# Patient Record
Sex: MALE | Race: WHITE | NOT HISPANIC OR LATINO | Employment: FULL TIME | URBAN - METROPOLITAN AREA
[De-identification: names, ages, dates, MRNs, and addresses within clinical notes are randomized per-mention and may not be internally consistent; named-entity substitution may affect disease eponyms.]

---

## 2017-01-05 ENCOUNTER — TELEPHONE (OUTPATIENT)
Dept: FAMILY MEDICINE CLINIC | Facility: CLINIC | Age: 52
End: 2017-01-05

## 2017-01-05 NOTE — TELEPHONE ENCOUNTER
Patient was seen on 12/21 and was prescribed a Z-Pac.  Patient states he has finished his antibiotic but now needs something for cough. Patient has nka. [facility] is  [facility].

## 2017-02-16 RX ORDER — DICYCLOMINE HCL 20 MG
20 TABLET ORAL EVERY 6 HOURS
Qty: 20 TABLET | Refills: 0 | Status: SHIPPED | OUTPATIENT
Start: 2017-02-16 | End: 2018-10-11

## 2017-10-27 ENCOUNTER — CLINICAL SUPPORT (OUTPATIENT)
Dept: FAMILY MEDICINE CLINIC | Facility: CLINIC | Age: 52
End: 2017-10-27

## 2017-10-27 DIAGNOSIS — Z23 IMMUNIZATION DUE: Primary | ICD-10-CM

## 2018-10-11 ENCOUNTER — OFFICE VISIT (OUTPATIENT)
Dept: FAMILY MEDICINE CLINIC | Facility: CLINIC | Age: 53
End: 2018-10-11

## 2018-10-11 VITALS
OXYGEN SATURATION: 98 % | DIASTOLIC BLOOD PRESSURE: 58 MMHG | HEART RATE: 55 BPM | WEIGHT: 204 LBS | HEIGHT: 69 IN | SYSTOLIC BLOOD PRESSURE: 126 MMHG | BODY MASS INDEX: 30.21 KG/M2 | TEMPERATURE: 98.3 F

## 2018-10-11 DIAGNOSIS — F32.9 REACTIVE DEPRESSION: Primary | ICD-10-CM

## 2018-10-11 DIAGNOSIS — F41.9 ANXIETY: ICD-10-CM

## 2018-10-11 DIAGNOSIS — I10 ESSENTIAL HYPERTENSION, BENIGN: ICD-10-CM

## 2018-10-11 DIAGNOSIS — Z23 IMMUNIZATION DUE: ICD-10-CM

## 2018-10-11 PROCEDURE — 99214 OFFICE O/P EST MOD 30 MIN: CPT | Performed by: FAMILY MEDICINE

## 2018-10-12 NOTE — PROGRESS NOTES
Subjective   Luis Faust is a 53 y.o. male    Chief Complaint:    Hypertension  Anxiety and depression      History of Present Illness     Patient presents today for follow-up regarding hypertension.  He recently developed some monocular blepharospasm and was seen by his optometrist who felt this was stress related.  Patient has been suffering with anxiety, depression and grief since the death of his 25-year-old son due to opioid overdose.  Extended visit today of > 25 minutes with greater than 50% time spent counseling with patient.  Patient is involved in a group grief counseling but his wife attends also.  I have recommended the patient get some individual counseling.  He is agreeable to this.      The following portions of the patient's history were reviewed and updated as appropriate: allergies, current medications, past social history and problem list    Review of Systems   Constitutional: Negative for appetite change, fatigue and unexpected weight change.   Respiratory: Negative for cough, chest tightness and shortness of breath.    Cardiovascular: Negative for chest pain, palpitations and leg swelling.   Gastrointestinal: Negative for abdominal pain, diarrhea and nausea.   Skin: Negative for color change and rash.   Neurological: Negative for dizziness, tremors, syncope, weakness, light-headedness and headaches.   Psychiatric/Behavioral: Positive for dysphoric mood and sleep disturbance. Negative for agitation, behavioral problems, confusion, decreased concentration and suicidal ideas. The patient is nervous/anxious.        Objective     Vitals:    10/11/18 1604   BP: 126/58   Pulse: 55   Temp: 98.3 °F (36.8 °C)   SpO2: 98%       Physical Exam   Constitutional: He appears well-developed and well-nourished.   Neck: No JVD present.   Cardiovascular: Normal rate, regular rhythm, normal heart sounds, intact distal pulses and normal pulses.    No murmur heard.  Pulmonary/Chest: Effort normal and breath  sounds normal. No respiratory distress.   Abdominal: Soft. Bowel sounds are normal. There is no hepatosplenomegaly. There is no tenderness.   Musculoskeletal: He exhibits no edema.   Skin: Skin is warm and dry.   Psychiatric: His speech is normal and behavior is normal. Cognition and memory are normal. He exhibits a depressed mood.   Nursing note and vitals reviewed.      Assessment/Plan   Problem List Items Addressed This Visit        Cardiovascular and Mediastinum    Essential hypertension, benign      Other Visit Diagnoses     Reactive depression    -  Primary    Immunization due        Relevant Medications    pneumococcal polysaccharide 23-valent (PNEUMOVAX-23) vaccine 0.5 mL (Completed)    Anxiety

## 2019-07-29 ENCOUNTER — TELEPHONE (OUTPATIENT)
Dept: FAMILY MEDICINE CLINIC | Facility: CLINIC | Age: 54
End: 2019-07-29

## 2019-07-29 NOTE — TELEPHONE ENCOUNTER
I printed this message and gave it to Nabila so she can contact patient to schedule his appointment.

## 2019-07-29 NOTE — TELEPHONE ENCOUNTER
----- Message from Sally Chowdhury sent at 7/29/2019  8:21 AM EDT -----  Contact: PATIENT  PATIENT NEEDS A PHYSICAL BEFORE SEPT 1ST FOR INSURANCE REASONS; HOWEVER, DR. VARGAS DOES NOT HAVE ANY APPOINTMENTS UNTIL OCT. PATIENT WANTS ME TO ASK DR. VARGAS IS HE COULD SEE HIM BEFORE SEPT 1ST.    CELL [phone]

## 2019-08-07 ENCOUNTER — LAB (OUTPATIENT)
Dept: LAB | Facility: HOSPITAL | Age: 54
End: 2019-08-07

## 2019-08-07 ENCOUNTER — OFFICE VISIT (OUTPATIENT)
Dept: FAMILY MEDICINE CLINIC | Facility: CLINIC | Age: 54
End: 2019-08-07

## 2019-08-07 VITALS
WEIGHT: 204 LBS | SYSTOLIC BLOOD PRESSURE: 120 MMHG | HEART RATE: 64 BPM | TEMPERATURE: 98 F | DIASTOLIC BLOOD PRESSURE: 64 MMHG | HEIGHT: 69 IN | OXYGEN SATURATION: 98 % | BODY MASS INDEX: 30.21 KG/M2

## 2019-08-07 DIAGNOSIS — Z12.5 PROSTATE CANCER SCREENING: ICD-10-CM

## 2019-08-07 DIAGNOSIS — Z00.00 ROUTINE GENERAL MEDICAL EXAMINATION AT A HEALTH CARE FACILITY: ICD-10-CM

## 2019-08-07 DIAGNOSIS — Z00.00 ROUTINE GENERAL MEDICAL EXAMINATION AT A HEALTH CARE FACILITY: Primary | ICD-10-CM

## 2019-08-07 LAB
ALBUMIN SERPL-MCNC: 4.4 G/DL (ref 3.5–5.2)
ALP SERPL-CCNC: 58 U/L (ref 39–117)
ALT SERPL W P-5'-P-CCNC: 18 U/L (ref 1–41)
AST SERPL-CCNC: 17 U/L (ref 1–40)
BILIRUB CONJ SERPL-MCNC: <0.2 MG/DL (ref 0.2–0.3)
BILIRUB INDIRECT SERPL-MCNC: ABNORMAL MG/DL
BILIRUB SERPL-MCNC: 0.4 MG/DL (ref 0.2–1.2)
DEPRECATED RDW RBC AUTO: 41.2 FL (ref 37–54)
ERYTHROCYTE [DISTWIDTH] IN BLOOD BY AUTOMATED COUNT: 12.5 % (ref 12.3–15.4)
HCT VFR BLD AUTO: 48.1 % (ref 37.5–51)
HGB BLD-MCNC: 15.6 G/DL (ref 13–17.7)
MCH RBC QN AUTO: 29.4 PG (ref 26.6–33)
MCHC RBC AUTO-ENTMCNC: 32.4 G/DL (ref 31.5–35.7)
MCV RBC AUTO: 90.6 FL (ref 79–97)
PLATELET # BLD AUTO: 268 10*3/MM3 (ref 140–450)
PMV BLD AUTO: 11.3 FL (ref 6–12)
PROT SERPL-MCNC: 7.2 G/DL (ref 6–8.5)
PSA SERPL-MCNC: 0.89 NG/ML (ref 0–4)
RBC # BLD AUTO: 5.31 10*6/MM3 (ref 4.14–5.8)
WBC NRBC COR # BLD: 6.9 10*3/MM3 (ref 3.4–10.8)

## 2019-08-07 PROCEDURE — 85027 COMPLETE CBC AUTOMATED: CPT

## 2019-08-07 PROCEDURE — 80076 HEPATIC FUNCTION PANEL: CPT

## 2019-08-07 PROCEDURE — 36415 COLL VENOUS BLD VENIPUNCTURE: CPT

## 2019-08-07 PROCEDURE — G0103 PSA SCREENING: HCPCS

## 2019-08-07 PROCEDURE — 99396 PREV VISIT EST AGE 40-64: CPT | Performed by: FAMILY MEDICINE

## 2019-08-07 NOTE — PROGRESS NOTES
Subjective   Luis Faust is a 53 y.o. male    Chief Complaint    Annual physical exam  Prostate cancer screening  Type 1 diabetes mellitus on insulin pump    History of Present Illness  Patient presents today for annual physical examination and prostate cancer screening.  He had a recent follow-up with his endocrinologist at the [facility].  Labs including a BMP, hemoglobin A1c and urine microalbumin test were all done.  Copies are provided and will be scanned into his electronic medical record here.  He did not have a PSA, CBC or liver function studies.  We will get those today.  Patient's only concern is that of some fatigue.  He exercises regularly and has not noticed any difference at all in his exercise stamina.  He does not have any chest pain whatsoever and does not have exertional dyspnea.  Patient has a son who is a 3-year medical student at [employer] and is doing quite well.  He lost a son last year to opioid overdose.  He seems to be absorbing that loss appropriately.  He did receive counseling.    The following portions of the patient's history were reviewed and updated as appropriate: allergies, current medications, past social history and problem list    Review of Systems   Constitutional: Negative.  Negative for appetite change, diaphoresis, fatigue and unexpected weight change.   HENT: Negative.    Eyes: Negative.  Negative for visual disturbance.   Respiratory: Negative.  Negative for chest tightness and shortness of breath.    Cardiovascular: Negative.  Negative for chest pain, palpitations and leg swelling.   Gastrointestinal: Negative.  Negative for diarrhea, nausea and vomiting.   Endocrine: Negative.  Negative for polydipsia, polyphagia and polyuria.   Genitourinary: Negative.    Musculoskeletal: Negative.    Skin: Negative.  Negative for color change.   Allergic/Immunologic: Negative.    Neurological: Negative.  Negative for dizziness, weakness,  light-headedness and numbness.   Hematological: Negative.    Psychiatric/Behavioral: Negative.    All other systems reviewed and are negative.      Objective     Vitals:    08/07/19 0852   BP: 120/64   Pulse: 64   Temp: 98 °F (36.7 °C)   SpO2: 98%       Physical Exam   Constitutional: He is oriented to person, place, and time. He appears well-developed and well-nourished.   HENT:   Head: Normocephalic and atraumatic.   Right Ear: External ear normal.   Left Ear: External ear normal.   Nose: Nose normal.   Mouth/Throat: Oropharynx is clear and moist.   Eyes: Conjunctivae and EOM are normal. Pupils are equal, round, and reactive to light.   Neck: Normal range of motion. Neck supple. No JVD present. No thyromegaly present.   Cardiovascular: Normal rate, regular rhythm, normal heart sounds and intact distal pulses.   No murmur heard.  Pulmonary/Chest: Effort normal and breath sounds normal.   Abdominal: Soft. Bowel sounds are normal. He exhibits no mass. There is no tenderness.   Genitourinary: Rectum normal, prostate normal and penis normal.   Musculoskeletal: Normal range of motion. He exhibits no edema.   Lymphadenopathy:     He has no cervical adenopathy.   Neurological: He is alert and oriented to person, place, and time. He has normal reflexes. No cranial nerve deficit or sensory deficit.   Skin: Skin is warm and dry. He is not diaphoretic.   Psychiatric: He has a normal mood and affect. His behavior is normal.   Nursing note and vitals reviewed.      Assessment/Plan   Problem List Items Addressed This Visit     None      Visit Diagnoses     Routine general medical examination at a health care facility    -  Primary    Relevant Orders    Hepatic Function Panel    CBC (No Diff)    Prostate cancer screening        Relevant Orders    PSA Screen        Patient is counseled during today's visit regarding preventative health measures to include use of seatbelts, medication safety, healthy diet and regular exercise.

## 2019-10-25 ENCOUNTER — HOSPITAL ENCOUNTER (OUTPATIENT)
Dept: GENERAL RADIOLOGY | Facility: HOSPITAL | Age: 54
Discharge: HOME | End: 2019-10-25
Admitting: FAMILY MEDICINE

## 2019-10-25 ENCOUNTER — OFFICE VISIT (OUTPATIENT)
Dept: FAMILY MEDICINE CLINIC | Facility: CLINIC | Age: 54
End: 2019-10-25

## 2019-10-25 VITALS
OXYGEN SATURATION: 98 % | HEART RATE: 65 BPM | DIASTOLIC BLOOD PRESSURE: 62 MMHG | SYSTOLIC BLOOD PRESSURE: 122 MMHG | BODY MASS INDEX: 29.77 KG/M2 | HEIGHT: 69 IN | WEIGHT: 201 LBS | TEMPERATURE: 97.7 F

## 2019-10-25 DIAGNOSIS — M25.531 RIGHT WRIST PAIN: Primary | ICD-10-CM

## 2019-10-25 DIAGNOSIS — M25.531 RIGHT WRIST PAIN: ICD-10-CM

## 2019-10-25 DIAGNOSIS — T07.XXXA MULTIPLE CONTUSIONS: ICD-10-CM

## 2019-10-25 PROCEDURE — 99213 OFFICE O/P EST LOW 20 MIN: CPT | Performed by: FAMILY MEDICINE

## 2019-10-25 PROCEDURE — 73110 X-RAY EXAM OF WRIST: CPT

## 2019-10-25 RX ORDER — INFLUENZA A VIRUS A/SINGAPORE/GP1908/2015 IVR-180 (H1N1) ANTIGEN (MDCK CELL DERIVED, PROPIOLACTONE INACTIVATED), INFLUENZA A VIRUS A/NORTH CAROLINA/04/2016 (H3N2) HEMAGGLUTININ ANTIGEN (MDCK CELL DERIVED, PROPIOLACTONE INACTIVATED), INFLUENZA B VIRUS B/IOWA/06/2017 HEMAGGLUTININ ANTIGEN (MDCK CELL DERIVED, PROPIOLACTONE INACTIVATED), INFLUENZA B VIRUS B/SINGAPORE/INFTT-16-0610/2016 HEMAGGLUTININ ANTIGEN (MDCK CELL DERIVED, PROPIOLACTONE INACTIVATED) 15; 15; 15; 15 UG/.5ML; UG/.5ML; UG/.5ML; UG/.5ML
INJECTION, SUSPENSION INTRAMUSCULAR
Refills: 0 | COMMUNITY
Start: 2019-10-10 | End: 2021-08-31
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
## 2019-11-15 DIAGNOSIS — I10 ESSENTIAL HYPERTENSION, BENIGN: Primary | ICD-10-CM

## 2019-11-15 DIAGNOSIS — E10.65 TYPE 1 DIABETES MELLITUS WITH HYPERGLYCEMIA (HCC): ICD-10-CM

## 2020-01-16 ENCOUNTER — TRANSCRIBE ORDERS (OUTPATIENT)
Dept: PHYSICAL THERAPY | Facility: CLINIC | Age: 55
End: 2020-01-16

## 2020-01-16 DIAGNOSIS — M25.531 RIGHT WRIST PAIN: Primary | ICD-10-CM

## 2020-01-20 ENCOUNTER — TREATMENT (OUTPATIENT)
Dept: PHYSICAL THERAPY | Facility: CLINIC | Age: 55
End: 2020-01-20

## 2020-01-20 DIAGNOSIS — S52.614A CLOSED NONDISPLACED FRACTURE OF STYLOID PROCESS OF RIGHT ULNA, INITIAL ENCOUNTER: ICD-10-CM

## 2020-01-20 DIAGNOSIS — M25.531 RIGHT WRIST PAIN: Primary | ICD-10-CM

## 2020-01-20 PROCEDURE — 97162 PT EVAL MOD COMPLEX 30 MIN: CPT | Performed by: PHYSICAL THERAPIST

## 2020-01-20 PROCEDURE — 97140 MANUAL THERAPY 1/> REGIONS: CPT | Performed by: PHYSICAL THERAPIST

## 2020-01-20 PROCEDURE — 97110 THERAPEUTIC EXERCISES: CPT | Performed by: PHYSICAL THERAPIST

## 2020-01-20 PROCEDURE — 97035 APP MDLTY 1+ULTRASOUND EA 15: CPT | Performed by: PHYSICAL THERAPIST
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
## 2020-01-22 ENCOUNTER — TREATMENT (OUTPATIENT)
Dept: PHYSICAL THERAPY | Facility: CLINIC | Age: 55
End: 2020-01-22

## 2020-01-22 DIAGNOSIS — M25.531 RIGHT WRIST PAIN: Primary | ICD-10-CM

## 2020-01-22 DIAGNOSIS — S52.614S CLOSED NONDISPLACED FRACTURE OF STYLOID PROCESS OF RIGHT ULNA, SEQUELA: ICD-10-CM

## 2020-01-22 PROCEDURE — 97110 THERAPEUTIC EXERCISES: CPT | Performed by: PHYSICAL THERAPIST

## 2020-01-22 PROCEDURE — 97140 MANUAL THERAPY 1/> REGIONS: CPT | Performed by: PHYSICAL THERAPIST

## 2020-01-24 ENCOUNTER — TREATMENT (OUTPATIENT)
Dept: PHYSICAL THERAPY | Facility: CLINIC | Age: 55
End: 2020-01-24

## 2020-01-24 DIAGNOSIS — M25.531 RIGHT WRIST PAIN: Primary | ICD-10-CM

## 2020-01-24 DIAGNOSIS — S52.614S CLOSED NONDISPLACED FRACTURE OF STYLOID PROCESS OF RIGHT ULNA, SEQUELA: ICD-10-CM

## 2020-01-24 PROCEDURE — 97110 THERAPEUTIC EXERCISES: CPT | Performed by: PHYSICAL THERAPIST

## 2020-01-24 PROCEDURE — 97140 MANUAL THERAPY 1/> REGIONS: CPT | Performed by: PHYSICAL THERAPIST

## 2020-01-24 NOTE — PROGRESS NOTES
Physical Therapy Daily Progress Note        Patient: Luis Faust   : 1965  Diagnosis/ICD-10 Code:  Right wrist pain [M25.531]  Referring practitioner: Luis Wang Jr., *  Date of Initial Visit: Type: THERAPY  Noted: 2020  Today's Date: 2020  Patient seen for 2 sessions           Subjective   Luis Fuast reports: slept better without brace on last night.  Wrist feels some better. When there is pain is always across the wrist.    Objective   PALPATION: no specific tenderness today. Soreness throughout carpals, but scaphoid is not tender.  TEST: RCL stress test produces pain throughout carpals.  PROM RIGHT WRIST: extension 35 degrees, Flexion 40 degree, UD 20 degrees with pain.  See Exercise, Manual, and Modality Logs for complete treatment.       Assessment/Plan  Big improvement in ROM.  Less tender. Scaphoid appears to OK.  Progress per Plan of Care and Progress strengthening /stabilization /functional activity           Manual Therapy:    20     mins  54062;  Therapeutic Exercise:    39     mins  73996;     Neuromuscular Selina:        mins  87332;    Therapeutic Activity:          mins  25289;     Gait Training:           mins  03297;     Ultrasound:          mins  62548;    Electrical Stimulation:         mins  80656 ( );  Iontophoresis          mins 21852   Traction          mins  05151  Fluidotherapy          mins  67805  Dry Needling          mins self-pay  Paraffin          mins  66918    Timed Treatment:   59   mins   Total Treatment:     59   mins    Sly Haddad, PT,CHT  Physical Therapist

## 2020-01-27 NOTE — PROGRESS NOTES
Physical Therapy Daily Progress Note        Patient: Luis aFust   : 1965  Diagnosis/ICD-10 Code:  Right wrist pain [M25.531]  Referring practitioner: Luis Wang Jr., *  Date of Initial Visit: Type: THERAPY  Noted: 2020  Today's Date: 2020  Patient seen for 3 sessions           Subjective   Luis Faust reports: pain is through the carpals when he ulnarly deviates.      Objective   PALPATION: minimal tenderness at S-L area.  TEST: SL Ballottement test -  PROM RIGHT WRIST: extension 46 degrees, Flexion 44 degree, UD 20 degrees   See Exercise, Manual, and Modality Logs for complete treatment.       Assessment/Plan  ROM is improving but still having significant increases in ROM.   Progress per Plan of Care and Progress strengthening /stabilization /functional activity Pt will be out of town next on a business trip.           Manual Therapy:    25     mins  67732;  Therapeutic Exercise:    31     mins  22870;     Neuromuscular Selina:        mins  01177;    Therapeutic Activity:          mins  19359;     Gait Training:           mins  86592;     Ultrasound:          mins  63343;    Electrical Stimulation:         mins  07467 ( );  Iontophoresis          mins 36435   Traction          mins  91297  Fluidotherapy          mins  35107  Dry Needling          mins self-pay  Paraffin          mins  98334    Timed Treatment:   56   mins   Total Treatment:     56   mins    Sly Haddad PT,CHT  Physical Therapist

## 2020-01-31 ENCOUNTER — TREATMENT (OUTPATIENT)
Dept: PHYSICAL THERAPY | Facility: CLINIC | Age: 55
End: 2020-01-31

## 2020-01-31 DIAGNOSIS — S52.614S CLOSED NONDISPLACED FRACTURE OF STYLOID PROCESS OF RIGHT ULNA, SEQUELA: ICD-10-CM

## 2020-01-31 DIAGNOSIS — M25.531 RIGHT WRIST PAIN: Primary | ICD-10-CM

## 2020-01-31 PROCEDURE — 97140 MANUAL THERAPY 1/> REGIONS: CPT | Performed by: PHYSICAL THERAPIST

## 2020-01-31 PROCEDURE — 97110 THERAPEUTIC EXERCISES: CPT | Performed by: PHYSICAL THERAPIST

## 2020-01-31 PROCEDURE — 97035 APP MDLTY 1+ULTRASOUND EA 15: CPT | Performed by: PHYSICAL THERAPIST

## 2020-02-03 ENCOUNTER — TREATMENT (OUTPATIENT)
Dept: PHYSICAL THERAPY | Facility: CLINIC | Age: 55
End: 2020-02-03

## 2020-02-03 DIAGNOSIS — S52.614S CLOSED NONDISPLACED FRACTURE OF STYLOID PROCESS OF RIGHT ULNA, SEQUELA: ICD-10-CM

## 2020-02-03 DIAGNOSIS — M25.531 RIGHT WRIST PAIN: Primary | ICD-10-CM

## 2020-02-03 PROCEDURE — 97110 THERAPEUTIC EXERCISES: CPT | Performed by: PHYSICAL THERAPIST

## 2020-02-03 PROCEDURE — 97140 MANUAL THERAPY 1/> REGIONS: CPT | Performed by: PHYSICAL THERAPIST

## 2020-02-03 PROCEDURE — 97033 APP MDLTY 1+IONTPHRSIS EA 15: CPT | Performed by: PHYSICAL THERAPIST

## 2020-02-03 NOTE — PROGRESS NOTES
Physical Therapy Daily Progress Note        Patient: Luis Faust   : 1965  Diagnosis/ICD-10 Code:  Right wrist pain [M25.531]  Referring practitioner: Luis Wang Jr., *  Date of Initial Visit: Type: THERAPY  Noted: 2020  Today's Date: 2020  Patient seen for 4 sessions           Subjective   Luis Faust reports: pain became worse after working on HEP over the weekend, stopped all strengthening activities on Monday.    Objective   PALPATION: tenderness at Scaphoid area.  TEST: SL Ballottement test -, Hanson's test -  PROM RIGHT WRIST: UD cause pain over scaphoid, RD does not. Wrist extension 49 degrees, Flexion 45 degrees.    See Exercise, Manual, and Modality Logs for complete treatment.       Assessment/Plan  Pain over radial carpal area is present at times and other times OK, but is consistently tender at same place on scaphoid when he is tender.  Progress per Plan of Care and Progress strengthening /stabilization /functional activity          Manual Therapy:    25     mins  74640;  Therapeutic Exercise:    15     mins  29982;     Neuromuscular Selina:        mins  54456;    Therapeutic Activity:          mins  85605;     Gait Training:           mins  16458;     Ultrasound:     15     mins  56002;    Electrical Stimulation:         mins  22897 ( );  Iontophoresis          mins 19133   Traction          mins  20723  Fluidotherapy          mins  15833  Dry Needling          mins self-pay  Paraffin          mins  92449    Timed Treatment:   55   mins   Total Treatment:     55   mins    Sly Haddad, PT, CHT  Physical Therapist

## 2020-02-04 NOTE — PROGRESS NOTES
Physical Therapy Daily Progress Note        Patient: Luis Faust   : 1965  Diagnosis/ICD-10 Code:  Right wrist pain [M25.531]  Referring practitioner: Luis Wang Jr., *  Date of Initial Visit: Type: THERAPY  Noted: 2020  Today's Date: 2/3/2020  Patient seen for 5 sessions           Subjective   Luis Faust reports: radial side of wrist and thumb still hurts at times when he reaches for something    Objective   PALPATION: tender 1st and 2nd Dorsal compartment.  ROM: UD deviation hurts at base of thumb, no pain with RD.  Extension 48 degrees. Fingers are WNL.  See Exercise, Manual, and Modality Logs for complete treatment.       Assessment/Plan  ROM of wrist is improving, but sill has some type of Soft Tissue restrictions along radial side of wrist/thumb.  Progress per Plan of Care and Progress strengthening /stabilization /functional activity           Manual Therapy:    24     mins  16562;  Therapeutic Exercise:    15     mins  17055;     Neuromuscular Selina:        mins  27184;    Therapeutic Activity:          mins  75598;     Gait Training:           mins  20529;     Ultrasound:     8     mins  58410;    Electrical Stimulation:         mins  96027 ( );  Iontophoresis     10     mins 96616   Traction          mins  38610  Fluidotherapy          mins  26074  Dry Needling          mins self-pay  Paraffin          mins  27454    Timed Treatment:   57   mins   Total Treatment:     57   mins    Sly Haddad, PT, CHT  Physical Therapist

## 2020-02-07 ENCOUNTER — TREATMENT (OUTPATIENT)
Dept: PHYSICAL THERAPY | Facility: CLINIC | Age: 55
End: 2020-02-07

## 2020-02-07 DIAGNOSIS — M25.531 RIGHT WRIST PAIN: Primary | ICD-10-CM

## 2020-02-07 DIAGNOSIS — S52.614S CLOSED NONDISPLACED FRACTURE OF STYLOID PROCESS OF RIGHT ULNA, SEQUELA: ICD-10-CM

## 2020-02-07 PROCEDURE — 97140 MANUAL THERAPY 1/> REGIONS: CPT | Performed by: PHYSICAL THERAPIST

## 2020-02-07 PROCEDURE — 97530 THERAPEUTIC ACTIVITIES: CPT | Performed by: PHYSICAL THERAPIST

## 2020-02-07 PROCEDURE — 97110 THERAPEUTIC EXERCISES: CPT | Performed by: PHYSICAL THERAPIST

## 2020-02-07 PROCEDURE — 97033 APP MDLTY 1+IONTPHRSIS EA 15: CPT | Performed by: PHYSICAL THERAPIST

## 2020-02-10 ENCOUNTER — TREATMENT (OUTPATIENT)
Dept: PHYSICAL THERAPY | Facility: CLINIC | Age: 55
End: 2020-02-10

## 2020-02-10 DIAGNOSIS — M25.531 RIGHT WRIST PAIN: Primary | ICD-10-CM

## 2020-02-10 DIAGNOSIS — S52.614S CLOSED NONDISPLACED FRACTURE OF STYLOID PROCESS OF RIGHT ULNA, SEQUELA: ICD-10-CM

## 2020-02-10 PROCEDURE — 97110 THERAPEUTIC EXERCISES: CPT | Performed by: PHYSICAL THERAPIST

## 2020-02-10 PROCEDURE — A4556 ELECTRODES, PAIR: HCPCS | Performed by: PHYSICAL THERAPIST

## 2020-02-10 PROCEDURE — 97033 APP MDLTY 1+IONTPHRSIS EA 15: CPT | Performed by: PHYSICAL THERAPIST

## 2020-02-10 PROCEDURE — 97530 THERAPEUTIC ACTIVITIES: CPT | Performed by: PHYSICAL THERAPIST

## 2020-02-10 PROCEDURE — 97140 MANUAL THERAPY 1/> REGIONS: CPT | Performed by: PHYSICAL THERAPIST

## 2020-02-10 NOTE — PROGRESS NOTES
Physical Therapy Daily Progress Note        Patient: Luis Faust   : 1965  Diagnosis/ICD-10 Code:  Right wrist pain [M25.531]  Referring practitioner: Luis Wang Jr., *  Date of Initial Visit: Type: THERAPY  Noted: 2020  Today's Date: 2/10/2020  Patient seen for 6 sessions           Subjective   Luis Chenmiguel ángel reports: the ionto did seem to help some.    Objective   PALPATION: tender off edge of radial styloid process going into carpal joint space.  ROM: UD deviation hurts at base of thumb, no pain with RD.  Extension 46 degrees, flexion 40 degrees. Fingers are WNL.  See Exercise, Manual, and Modality Logs for complete treatment.       Assessment/Plan  ROM of wrist is about the same as earlier in the week., but sill has some type of Soft Tissue restrictions along radial side of wrist/thumb.  Progress per Plan of Care and Progress strengthening /stabilization /functional activity           Manual Therapy:    18     mins  25703;  Therapeutic Exercise:    15     mins  92300;     Neuromuscular Selina:        mins  71178;    Therapeutic Activity:     20     mins  86835;     Gait Training:           mins  07040;     Ultrasound:     8     mins  48490;    Electrical Stimulation:         mins  35362 ( );  Iontophoresis     10     mins 75353   Traction          mins  55341  Fluidotherapy          mins  10761  Dry Needling          mins self-pay  Paraffin          mins  61875    Timed Treatment:   71   mins   Total Treatment:     71   mins    Sly Haddad, PT, CHT  Physical Therapist

## 2020-02-12 NOTE — PROGRESS NOTES
Physical Therapy Daily Progress Note        Patient: Luis Faust   : 1965  Diagnosis/ICD-10 Code:  Right wrist pain [M25.531]  Referring practitioner: Luis Wang Jr., *  Date of Initial Visit: Type: THERAPY  Noted: 2020  Today's Date: 2/10/2020  Patient seen for 7 sessions           Subjective   Luis Faust reports: the ionto definitely helping some, less shooting pain, less stiffness.    Objective   PALPATION: tender off edge of radial styloid process going into carpal joint space.  ROM: Extension 52 degrees, flexion 44 degrees. Fingers are WNL.  TEST: Finkelstein's test -, intersecting syndrome -  See Exercise, Manual, and Modality Logs for complete treatment.       Assessment/Plan  ROM of wrist improved from last week., but ray has some type of Soft Tissue restrictions along radial side of wrist/thumb, but less severe.  Progress per Plan of Care and Progress strengthening /stabilization /functional activity           Manual Therapy:    15     mins  55522;  Therapeutic Exercise:    15     mins  58352;     Neuromuscular Selina:        mins  33108;    Therapeutic Activity:     20     mins  79699;     Gait Training:           mins  07173;     Ultrasound:     8     mins  22410;    Electrical Stimulation:         mins  46562 ( );  Iontophoresis     10     mins 97394   Traction          mins  06025  Fluidotherapy          mins  95427  Dry Needling          mins self-pay  Paraffin          mins  19185    Timed Treatment:   68   mins   Total Treatment:     68   mins    Sly Haddad, PT, CHT  Physical Therapist

## 2020-02-19 ENCOUNTER — TREATMENT (OUTPATIENT)
Dept: PHYSICAL THERAPY | Facility: CLINIC | Age: 55
End: 2020-02-19

## 2020-02-19 DIAGNOSIS — S52.614S CLOSED NONDISPLACED FRACTURE OF STYLOID PROCESS OF RIGHT ULNA, SEQUELA: ICD-10-CM

## 2020-02-19 DIAGNOSIS — M25.531 RIGHT WRIST PAIN: Primary | ICD-10-CM

## 2020-02-19 PROCEDURE — 97035 APP MDLTY 1+ULTRASOUND EA 15: CPT | Performed by: PHYSICAL THERAPIST

## 2020-02-19 PROCEDURE — 97530 THERAPEUTIC ACTIVITIES: CPT | Performed by: PHYSICAL THERAPIST

## 2020-02-19 PROCEDURE — 97110 THERAPEUTIC EXERCISES: CPT | Performed by: PHYSICAL THERAPIST

## 2020-02-19 PROCEDURE — 97140 MANUAL THERAPY 1/> REGIONS: CPT | Performed by: PHYSICAL THERAPIST

## 2020-02-21 NOTE — PROGRESS NOTES
Physical Therapy Daily Progress Note        Patient: Luis Faust   : 1965  Diagnosis/ICD-10 Code:  Right wrist pain [M25.531]  Referring practitioner: Luis Wang Jr., *  Date of Initial Visit: Type: THERAPY  Noted: 2020  Today's Date: 2020  Patient seen for 8 sessions           Subjective   Luis Faust reports: SEE NOTE TO MD IN MEDIA    Objective   SEE NOTE TO MD IN MEDIA  See Exercise, Manual, and Modality Logs for complete treatment.       Assessment/Plan  SEE NOTE TO MD IN MEDIA  Awaiting MD orders           Manual Therapy:    20     mins  10746;  Therapeutic Exercise:    15     mins  28329;     Neuromuscular Selina:        mins  74375;    Therapeutic Activity:     20     mins  09938;     Gait Training:           mins  63879;     Ultrasound:     13     mins  83911;    Electrical Stimulation:         mins  02925 ( );  Iontophoresis          mins 73763   Traction          mins  61932  Fluidotherapy          mins  62783  Dry Needling          mins self-pay  Paraffin          mins  96132    Timed Treatment:   68   mins   Total Treatment:     68   mins    Sly Haddad, PT, CHT  Physical Therapist

## 2020-02-25 ENCOUNTER — TREATMENT (OUTPATIENT)
Dept: PHYSICAL THERAPY | Facility: CLINIC | Age: 55
End: 2020-02-25

## 2020-02-25 DIAGNOSIS — M25.531 RIGHT WRIST PAIN: Primary | ICD-10-CM

## 2020-02-25 DIAGNOSIS — S52.614S CLOSED NONDISPLACED FRACTURE OF STYLOID PROCESS OF RIGHT ULNA, SEQUELA: ICD-10-CM

## 2020-02-25 PROCEDURE — 97140 MANUAL THERAPY 1/> REGIONS: CPT | Performed by: PHYSICAL THERAPIST

## 2020-02-25 PROCEDURE — 97014 ELECTRIC STIMULATION THERAPY: CPT | Performed by: PHYSICAL THERAPIST

## 2020-02-25 PROCEDURE — 97110 THERAPEUTIC EXERCISES: CPT | Performed by: PHYSICAL THERAPIST

## 2020-02-25 PROCEDURE — 97530 THERAPEUTIC ACTIVITIES: CPT | Performed by: PHYSICAL THERAPIST

## 2020-02-27 ENCOUNTER — TREATMENT (OUTPATIENT)
Dept: PHYSICAL THERAPY | Facility: CLINIC | Age: 55
End: 2020-02-27

## 2020-02-27 DIAGNOSIS — M25.531 RIGHT WRIST PAIN: Primary | ICD-10-CM

## 2020-02-27 DIAGNOSIS — S52.614S CLOSED NONDISPLACED FRACTURE OF STYLOID PROCESS OF RIGHT ULNA, SEQUELA: ICD-10-CM

## 2020-02-27 PROCEDURE — 97033 APP MDLTY 1+IONTPHRSIS EA 15: CPT | Performed by: PHYSICAL THERAPIST

## 2020-02-27 PROCEDURE — 97140 MANUAL THERAPY 1/> REGIONS: CPT | Performed by: PHYSICAL THERAPIST

## 2020-02-27 PROCEDURE — 97110 THERAPEUTIC EXERCISES: CPT | Performed by: PHYSICAL THERAPIST

## 2020-02-27 NOTE — PROGRESS NOTES
Physical Therapy Daily Progress Note        Patient: Luis Faust   : 1965  Diagnosis/ICD-10 Code:  Right wrist pain [M25.531]  Referring practitioner: Luis Wang Jr., *  Date of Initial Visit: Type: THERAPY  Noted: 2020  Today's Date: 2020  Patient seen for 9 sessions           Subjective   Luis Faust reports: was painfully sore radial side of wrist for about 3 days after last treatment.  Has not hurt today. Pt wants to ride bicycle again, but cannot weight bear the hand/wrist yet due to discomfort dorsal wrist.    Objective   TEST: Finkelstein's was neg at beginning of treatment, after BTE wrist flex/ext Finkelstein's test +.  PALPATION: at beginning of treatment was tender over SL area dorsal, after BTE he was tender along 1st dorsal compartment.  See Exercise, Manual, and Modality Logs for complete treatment.       Assessment/Plan  Pt may have some 1st Dorsal compartment irritation/DeQuervain's.  Carpals limited ROM due to prolong immobilization.  Progress per Plan of Care and Progress strengthening /stabilization /functional activity           Manual Therapy:    17     mins  70821;  Therapeutic Exercise:    15     mins  15688;     Neuromuscular Selina:        mins  68123;    Therapeutic Activity:     20     mins  23930;     Gait Training:           mins  29862;     Ultrasound:     12     mins  75056;    Electrical Stimulation:    20     mins  66256 ( );  Iontophoresis          mins 94362   Traction          mins  87776  Fluidotherapy          mins  83147  Dry Needling          mins self-pay  Paraffin          mins  22365    Timed Treatment:   64   mins   Total Treatment:     84   mins    Sly Haddad, PT, CHT  Physical Therapist

## 2020-02-28 NOTE — PROGRESS NOTES
Physical Therapy Daily Progress Note        Patient: Luis Faust   : 1965  Diagnosis/ICD-10 Code:  Right wrist pain [M25.531]  Referring practitioner: Luis Wang Jr., *  Date of Initial Visit: Type: THERAPY  Noted: 2020  Today's Date: 2020  Patient seen for 10 sessions           Subjective   Luis Faust reports: SEE LETTER TO MD IN MEDIA.    Objective   SEE LETTER TO MD IN MEDIA  See Exercise, Manual, and Modality Logs for complete treatment.       Assessment/Plan  SEE LETTER TO MD IN MEDIA.  Awaiting MD orders           Manual Therapy:    30     mins  83240;  Therapeutic Exercise:    15     mins  94854;     Neuromuscular Selina:        mins  66877;    Therapeutic Activity:          mins  13682;     Gait Training:           mins  39091;     Ultrasound:     8     mins  74183;    Electrical Stimulation:         mins  72409 ( );  Iontophoresis     10     mins 05464   Traction          mins  62086  Fluidotherapy          mins  09323  Dry Needling          mins self-pay  Paraffin          mins  35566    Timed Treatment:   63   mins   Total Treatment:     63   mins    Sly Haddad, PT, CHT  Physical Therapist

## 2020-11-25 ENCOUNTER — TELEPHONE (OUTPATIENT)
Dept: FAMILY MEDICINE CLINIC | Facility: CLINIC | Age: 55
End: 2020-11-25

## 2020-11-25 NOTE — TELEPHONE ENCOUNTER
PT STATED THAT HE GOT FOOD POISONING ON Sunday, PT STATED THAT HE DOES NOT THINK ITS COVID BUT HE WOULD LIKE TO SPEAK WITH SOMEONE ABOUT HIS SYMPTOMS AND CONCERNS.      [phone]     PLEASE ADVISE

## 2020-11-25 NOTE — TELEPHONE ENCOUNTER
Understanding Colon and Rectal Polyps    The colon (also called the large intestine) is a muscular tube that forms the last part of the digestive tract. It absorbs water and stores food waste. The colon is about 4 to 6 feet long. The rectum is the last 6 inches of the colon. The colon and rectum have a smooth lining composed of millions of cells. Changes in these cells can lead to growths in the colon that can become cancerous and should be removed. Multiple tests are available to screen for colon cancer, but the colonoscopy is the most recommended test. During colonoscopy, these polyps can be removed. How often you need this test depends on many things including your condition, your family history, symptoms, and what the findings were at the previous colonoscopy.   When the colon lining changes  Changes that happen in the cells that line the colon or rectum can lead to growths called polyps. Over a period of years, polyps can turn cancerous. Removing polyps early may prevent cancer from ever forming.  Polyps  Polyps are fleshy clumps of tissue that form on the lining of the colon or rectum. Small polyps are usually benign (not cancerous). However, over time, cells in a polyp can change and become cancerous. Certain types of polyps known as adenomatous polyps are premalignant. The risk for invasive cancer increases with the size of the polyp and certain cell and gene features. This means that they can become cancerous if they're not removed. Hyperplastic polyps are benign. They can grow quite large and not turn cancerous.   Cancer  Almost all colorectal cancers start when polyp cells begin growing abnormally. As a cancerous tumor grows, it may involve more and more of the colon or rectum. In time, cancer can also grow beyond the colon or rectum and spread to nearby organs or to glands called lymph nodes. The cells can also travel to other parts of the body. This is known as metastasis. The earlier a cancerous  Spoke with pt; he said his only sx is diarrhea and he didn't know if he needed to do anything. I told him to keep drinking fluids and let it runs its course. If no better in a few days seek treatment at [facility] or ER.   tumor is removed, the better the chance of preventing its spread.    Date Last Reviewed: 8/1/2016  © 5044-4975 [employer]. [address] [place] [address], [place] [address]. All rights reserved. This information is not intended as a substitute for professional medical care. Always follow your healthcare professional's instructions.

## 2020-12-15 ENCOUNTER — TELEPHONE (OUTPATIENT)
Dept: FAMILY MEDICINE CLINIC | Facility: CLINIC | Age: 55
End: 2020-12-15

## 2020-12-15 NOTE — TELEPHONE ENCOUNTER
PT STATED HE HAD A WHITE BOWEL MOVEMENT LAST NIGHT AND HAS BEEN HAVING ABD PAIN,, PT IS CONCERNED WITH HIS DIABETES. PT IS SCHEDULE FOR 12-, PT STATED HE WOULD LIKE TO BE SEEN SOONER. NOTHING ELSE IS AVAILABLE.      CALLBACK:[phone]   need for outpatient follow-up/return to ED if symptoms worsen, persist or questions arise/lab results/radiology results Counseling Text: I reviewed the side effect in detail. Patient should get monthly blood tests, not donate blood, not drive at night if vision affected, and not share medication.

## 2020-12-16 DIAGNOSIS — R19.5 ACHOLIC STOOL: ICD-10-CM

## 2020-12-16 DIAGNOSIS — R10.11 RIGHT UPPER QUADRANT PAIN: Primary | ICD-10-CM

## 2020-12-16 NOTE — TELEPHONE ENCOUNTER
Acholic stools usually means something to do with the gallbladder, that he is not excreting bile into his GI tract.  Will place an urgent order for a gallbladder ultrasound.  Please notify the patient

## 2020-12-23 NOTE — TELEPHONE ENCOUNTER
Pt already had this done and gallbladder US was normal liver was enlarged, and has been seeing Dr. Burch.

## 2021-03-08 ENCOUNTER — IMMUNIZATION (OUTPATIENT)
Dept: VACCINE CLINIC | Facility: HOSPITAL | Age: 56
End: 2021-03-08

## 2021-03-08 PROCEDURE — 91300 HC SARSCOV02 VAC 30MCG/0.3ML IM: CPT | Performed by: INTERNAL MEDICINE

## 2021-03-08 PROCEDURE — 0001A: CPT | Performed by: INTERNAL MEDICINE

## 2021-03-29 ENCOUNTER — IMMUNIZATION (OUTPATIENT)
Dept: VACCINE CLINIC | Facility: HOSPITAL | Age: 56
End: 2021-03-29

## 2021-03-29 PROCEDURE — 91300 HC SARSCOV02 VAC 30MCG/0.3ML IM: CPT | Performed by: INTERNAL MEDICINE

## 2021-03-29 PROCEDURE — 0002A: CPT | Performed by: INTERNAL MEDICINE

## 2021-08-31 ENCOUNTER — LAB (OUTPATIENT)
Dept: LAB | Facility: HOSPITAL | Age: 56
End: 2021-08-31

## 2021-08-31 ENCOUNTER — OFFICE VISIT (OUTPATIENT)
Dept: FAMILY MEDICINE CLINIC | Facility: CLINIC | Age: 56
End: 2021-08-31

## 2021-08-31 VITALS
HEART RATE: 59 BPM | HEIGHT: 68 IN | DIASTOLIC BLOOD PRESSURE: 78 MMHG | BODY MASS INDEX: 29.98 KG/M2 | RESPIRATION RATE: 16 BRPM | TEMPERATURE: 96.8 F | OXYGEN SATURATION: 98 % | WEIGHT: 197.8 LBS | SYSTOLIC BLOOD PRESSURE: 118 MMHG

## 2021-08-31 DIAGNOSIS — E10.65 TYPE 1 DIABETES MELLITUS WITH HYPERGLYCEMIA (HCC): ICD-10-CM

## 2021-08-31 DIAGNOSIS — Z12.5 PROSTATE CANCER SCREENING: ICD-10-CM

## 2021-08-31 DIAGNOSIS — I10 ESSENTIAL HYPERTENSION, BENIGN: ICD-10-CM

## 2021-08-31 DIAGNOSIS — Z00.00 ANNUAL PHYSICAL EXAM: Primary | ICD-10-CM

## 2021-08-31 DIAGNOSIS — K21.9 GASTROESOPHAGEAL REFLUX DISEASE, UNSPECIFIED WHETHER ESOPHAGITIS PRESENT: ICD-10-CM

## 2021-08-31 LAB — PSA SERPL-MCNC: 0.56 NG/ML (ref 0–4)

## 2021-08-31 PROCEDURE — 99396 PREV VISIT EST AGE 40-64: CPT | Performed by: FAMILY MEDICINE

## 2021-08-31 PROCEDURE — 36415 COLL VENOUS BLD VENIPUNCTURE: CPT

## 2021-08-31 PROCEDURE — G0103 PSA SCREENING: HCPCS

## 2021-08-31 RX ORDER — ATORVASTATIN CALCIUM 20 MG/1
20 TABLET, FILM COATED ORAL DAILY
COMMUNITY

## 2021-08-31 NOTE — PROGRESS NOTES
"Subjective   Luis Faust is a 56 y.o. male    Chief Complaint    Annual physical exam   Essential hypertension   Type 1 diabetes mellitus   Gastroesophageal reflux disease    History of Present Illness  The patient presents today for his annual physical examination. He said he got sick in [place] at Thanksgiving on shellfish. The patient states he had this really bad pain in his chest 12/2020 twice. He called Dr. Burch's office who told him if it was not bothering him now, he should not go to the emergency room. The following week he had a HIDA scan thinking it was his gallbladder. Then he thought he may have passed a stone. They did an ultrasound and was told his liver was overproducing enzymes and was swollen. It was then thought the patient had hepatitis from the shellfish he had eaten on Thanksgiving week.     He then decided to have clams on Sunday night and he doubled over and got sick from that. The patient states it was miserable and he got enough to get back on. He said he did not feel 100 percent until 04/2021. The patient said that he has had 4 blood work ups and the HIDA scan and the other scan. He reports that he had a scope in 10/2020 or 09/2020 and the patient was put on the 7 year plan, which is a graduation from the 5 year plan. In 03/2021 the doctor notes \"the patient likely has had recurrent biliary colic,\" which just means pain, \"and will likely need a HIDA scan and possibly send to Dr. Richards for gallbladder removal.\" He was thinking the patient was going to have his gallbladder removed, so he was thinking he was going to have it done.  The patient first went to Dr. Burch in 07/2020 and then had the colonoscopy in 09/2020.     The patient said that his wife is a nurse and his son is in medical school for 4 years at the time. He reports that his wife is a nurse and his son is a nurse. The patient reports that he got his vaccinations at [facility] and he ordered a booster and he had it on " 03/07/2021. He reports he will get the booster when it is available. The patient said he has had 2 different pneumonia vaccines and he is unsure if he needs an update on that. He has not had a shingles vaccine.     The patient has an appointment with Dr. Naren Harris on 09/02/2021. His last hemoglobin was 5.9 and is staying 5.9 to 6.3. He would like to get a PSA done.     He will see Dr. Richard for his annual eye check 2 weeks from now. The patient said he has been consistent with his dental checkups.    The patient describes situational depression during the COVID pandemic. He was working at home and still feeling the loss of his son who passed 3 years ago. The patient does have someone he can talk to when he needs it.     The following portions of the patient's history were reviewed and updated as appropriate: allergies, current medications, past social history and problem list    Review of Systems   Constitutional: Negative.  Negative for appetite change, diaphoresis, fatigue and unexpected weight change.   HENT: Negative.    Eyes: Negative.  Negative for visual disturbance.   Respiratory: Negative.  Negative for cough, chest tightness and shortness of breath.    Cardiovascular: Negative.  Negative for chest pain, palpitations and leg swelling.   Gastrointestinal: Negative.  Negative for diarrhea, nausea and vomiting.   Endocrine: Negative.  Negative for polydipsia, polyphagia and polyuria.   Genitourinary: Negative.    Musculoskeletal: Negative.    Skin: Negative.  Negative for color change and rash.   Allergic/Immunologic: Negative.    Neurological: Negative.  Negative for dizziness, syncope, weakness, light-headedness, numbness and headaches.   Hematological: Negative.    Psychiatric/Behavioral: Negative.    All other systems reviewed and are negative.      Objective     Vitals:    08/31/21 1400   BP: 118/78   Pulse: 59   Resp: 16   Temp: 96.8 °F (36 °C)   SpO2: 98%       Physical Exam  Vitals and nursing  note reviewed.   Constitutional:       Appearance: He is well-developed. He is not diaphoretic.   HENT:      Head: Normocephalic and atraumatic.      Right Ear: External ear normal.      Left Ear: External ear normal.      Nose: Nose normal.   Eyes:      Conjunctiva/sclera: Conjunctivae normal.      Pupils: Pupils are equal, round, and reactive to light.   Neck:      Thyroid: No thyromegaly.      Vascular: No JVD.   Cardiovascular:      Rate and Rhythm: Normal rate and regular rhythm.      Pulses: Normal pulses.      Heart sounds: Normal heart sounds. No murmur heard.     Pulmonary:      Effort: Pulmonary effort is normal. No respiratory distress.      Breath sounds: Normal breath sounds.   Abdominal:      General: Bowel sounds are normal.      Palpations: Abdomen is soft. There is no mass.      Tenderness: There is no abdominal tenderness.   Genitourinary:     Penis: Normal.       Prostate: Normal.      Rectum: Normal.   Musculoskeletal:         General: Normal range of motion.      Cervical back: Normal range of motion and neck supple.   Lymphadenopathy:      Cervical: No cervical adenopathy.   Skin:     General: Skin is warm and dry.   Neurological:      Mental Status: He is alert and oriented to person, place, and time.      Cranial Nerves: No cranial nerve deficit.      Sensory: No sensory deficit.      Deep Tendon Reflexes: Reflexes are normal and symmetric.         Assessment/Plan   Problems Addressed this Visit        Cardiac and Vasculature    Essential hypertension, benign       Endocrine and Metabolic    Diabetes mellitus type 1 (CMS/HCC)    Relevant Medications    insulin lispro (HumaLOG) 100 UNIT/ML patient supplied pump      Other Visit Diagnoses     Annual physical exam    -  Primary    Prostate cancer screening        Relevant Orders    PSA Screen    Gastroesophageal reflux disease, unspecified whether esophagitis present          Diagnoses       Codes Comments    Annual physical exam    -  Primary  ICD-10-CM: Z00.00  ICD-9-CM: V70.0     Prostate cancer screening     ICD-10-CM: Z12.5  ICD-9-CM: V76.44     Type 1 diabetes mellitus with hyperglycemia (CMS/[facility])     ICD-10-CM: E10.65  ICD-9-CM: 250.01     Essential hypertension, benign     ICD-10-CM: I10  ICD-9-CM: 401.1     Gastroesophageal reflux disease, unspecified whether esophagitis present     ICD-10-CM: K21.9  ICD-9-CM: 530.81         Preventive medicine discussed, diet, exercise, healthy living discussed at length.  Discussed nutrition, physical activity, healthy weight, injury prevention, missed use of tobacco, alcohol and drugs, dental health, mental health, immunizations, screening    Please note that portions of this document were completed with a voice recognition program. Efforts were made to edit the dictations, but occasionally words are mis-transcribed         Transcribed from ambient dictation for HERMILA Rincon MD by Unique Bonner.  08/31/21   15:46 EDT    I have personally performed the services described in this document as transcribed by the above individual, and it is both accurate and complete.  HERMILA Rincon MD  8/31/2021  20:57 EDT

## 2022-03-22 ENCOUNTER — TELEPHONE (OUTPATIENT)
Dept: FAMILY MEDICINE CLINIC | Facility: CLINIC | Age: 57
End: 2022-03-22

## 2022-03-22 NOTE — TELEPHONE ENCOUNTER
----- Message from Luis Faust sent at 3/22/2022 12:57 PM EDT -----  Regardinth vaccine   Hi Dr. Rincon,    Hope you’re doing well!    Since I’m a type 1 diabetic, and it’s been six months since booster three for Covid vax, should I get a fourth vax?    Thank you!  Nikhil

## 2022-03-30 ENCOUNTER — TELEPHONE (OUTPATIENT)
Dept: FAMILY MEDICINE CLINIC | Facility: CLINIC | Age: 57
End: 2022-03-30

## 2022-03-30 DIAGNOSIS — M54.31 SCIATICA OF RIGHT SIDE: Primary | ICD-10-CM

## 2022-03-30 NOTE — TELEPHONE ENCOUNTER
----- Message from Luis Faust sent at 3/28/2022  4:56 PM EDT -----  Regarding: PT for sciatic pain in right leg when driving  Hi,    Can you please ask Dr. Roth if he can provide a PT referral for me based on sciatic pain occurring in right leg only when and after driving?  If so, a PT place somewhere near [facility] or [place] is preferable.     Thank you,  Nikhil

## 2022-06-13 ENCOUNTER — TELEPHONE (OUTPATIENT)
Dept: FAMILY MEDICINE CLINIC | Facility: CLINIC | Age: 57
End: 2022-06-13

## 2022-06-13 NOTE — TELEPHONE ENCOUNTER
Caller: Luis Faust    Relationship: Self    Best call back number: [phone]    What medication are you requesting: PAXLOVID    What are your current symptoms: BAD COUGH, SORE THROAT    How long have you been experiencing symptoms: A WEEK    Have you had these symptoms before:    [] Yes  [x] No    Have you been treated for these symptoms before:   [] Yes  [x] No    If a prescription is needed, what is your preferred pharmacy and phone number: [facility] [address] AT [facility] [phone] [facility] [phone] FX     Additional notes:  PATIENT STATES THAT HE TESTED POSITIVE FOR COVID

## 2022-06-14 ENCOUNTER — TELEPHONE (OUTPATIENT)
Dept: FAMILY MEDICINE CLINIC | Facility: CLINIC | Age: 57
End: 2022-06-14

## 2022-06-14 RX ORDER — BROMPHENIRAMINE MALEATE, PSEUDOEPHEDRINE HYDROCHLORIDE, AND DEXTROMETHORPHAN HYDROBROMIDE 2; 30; 10 MG/5ML; MG/5ML; MG/5ML
5 SYRUP ORAL EVERY 4 HOURS PRN
Qty: 240 ML | Refills: 0 | Status: SHIPPED | OUTPATIENT
Start: 2022-06-14 | End: 2022-11-02

## 2022-06-14 NOTE — TELEPHONE ENCOUNTER
PATIENT SAID HE REACHED OUT ON Friday AND AGAIN YESTERDAY ABOUT GETTING SOMETHING CALLED IN FOR COUGH, CONGESTION. HE DID TEST POS FOR COVID LAST WEEK. HAS A HISTORY OF CONGESTION THAT LEADS TO BRONCHITIS. HE WOULD LIKE TO KNOW TODAY IF SOMETHING CAN BE CALLED IN FOR HIM.    [facility] [place] [place] - [address] [facility] [address] AT [facility] - [phone]  - [phone] FX   [address] [facility] [address]   Phone: [phone] Fax: [phone]

## 2022-06-16 ENCOUNTER — TELEPHONE (OUTPATIENT)
Dept: FAMILY MEDICINE CLINIC | Facility: CLINIC | Age: 57
End: 2022-06-16

## 2022-06-16 NOTE — TELEPHONE ENCOUNTER
Patient called to report that he had tested pos for covid and has been taking medication, reports that he is feeling a lot better, no more covid symptoms. Just wanted to let Pcp know that when he got up this morning he was coughing up a little blood. But has not since. Wanted to know what he should so.    Please call back [phone]

## 2022-11-02 ENCOUNTER — TELEPHONE (OUTPATIENT)
Dept: FAMILY MEDICINE CLINIC | Facility: CLINIC | Age: 57
End: 2022-11-02

## 2022-11-02 RX ORDER — BROMPHENIRAMINE MALEATE, PSEUDOEPHEDRINE HYDROCHLORIDE, AND DEXTROMETHORPHAN HYDROBROMIDE 2; 30; 10 MG/5ML; MG/5ML; MG/5ML
5 SYRUP ORAL 4 TIMES DAILY PRN
Qty: 180 ML | Refills: 0 | Status: SHIPPED | OUTPATIENT
Start: 2022-11-02

## 2022-11-02 RX ORDER — AZITHROMYCIN 250 MG/1
TABLET, FILM COATED ORAL
Qty: 6 TABLET | Refills: 0 | Status: SHIPPED | OUTPATIENT
Start: 2022-11-02

## 2022-11-02 NOTE — TELEPHONE ENCOUNTER
PATIENT CALLED TO SEE IF HE COULD BE WORKED IN OR IF SOMETHING CAN BE CALLED IN FOR HIM. STATED THAT HE HAS COUGH, CONGESTION, DRAINAGE AND THAT THIS IS SOMETHING HE GETS EVERY YEAR AND ID TREATED FOR. ALSO HE CAN GO GET AN XRAY IF NEEDED.    PLEASE ADVISE    [facility] [id number] - [place], [place] - [address] AT [facility] - [phone]  - [phone]    [address] [address]   Phone: [phone] Fax: [phone]   Hours: Not open 24 hours

## 2022-11-03 ENCOUNTER — TELEPHONE (OUTPATIENT)
Dept: FAMILY MEDICINE CLINIC | Facility: CLINIC | Age: 57
End: 2022-11-03

## 2022-11-03 NOTE — TELEPHONE ENCOUNTER
Patient stated that he has called several times this week to be seen by Dr Rincon, said he is having an issue with his lungs, that he is coughing a lot and it's productive, then he get winded and can't move around a lot. Said he does not want to wait until the weekend and have to go to the ER.    Could someone call him back and let him know what he needs to do.

## 2023-07-03 NOTE — TELEPHONE ENCOUNTER
Caller: Luis Faust    Relationship to patient: Self    Best call back number: [phone]  Patient is needing: RETURNING CALL TO SD           This is a chronic problem. The problem is well controlled. Patient monitors readings regularly. Pertinent negatives include no chest pain, focal sensory loss, focal weakness, leg pain, myalgias or shortness of breath. No headaches or chest pain. Takes medications regularly. Blood pressure has been stable, blood work was reviewed, and advised patient to continue the current instructions or medications.

## 2023-09-27 ENCOUNTER — TELEPHONE (OUTPATIENT)
Dept: FAMILY MEDICINE CLINIC | Facility: CLINIC | Age: 58
End: 2023-09-27

## 2023-09-27 NOTE — TELEPHONE ENCOUNTER
Pt is wanting to know when he should get his Flu, Covid, RSV, and pneumonia vaccines/if he is due?

## 2023-09-28 NOTE — TELEPHONE ENCOUNTER
yes Implemented All Universal Safety Interventions:  [place] to call system. Call bell, personal items and telephone within reach. Instruct patient to call for assistance. Room bathroom lighting operational. Non-slip footwear when patient is off stretcher. Physically safe environment: no spills, clutter or unnecessary equipment. Stretcher in lowest position, wheels locked, appropriate side rails in place.

## 2023-11-07 ENCOUNTER — OFFICE VISIT (OUTPATIENT)
Dept: FAMILY MEDICINE CLINIC | Facility: CLINIC | Age: 58
End: 2023-11-07
Payer: COMMERCIAL

## 2023-11-07 ENCOUNTER — LAB (OUTPATIENT)
Dept: FAMILY MEDICINE CLINIC | Facility: CLINIC | Age: 58
End: 2023-11-07
Payer: COMMERCIAL

## 2023-11-07 VITALS
SYSTOLIC BLOOD PRESSURE: 122 MMHG | WEIGHT: 205.8 LBS | OXYGEN SATURATION: 99 % | HEIGHT: 69 IN | RESPIRATION RATE: 14 BRPM | TEMPERATURE: 97.5 F | DIASTOLIC BLOOD PRESSURE: 68 MMHG | BODY MASS INDEX: 30.48 KG/M2 | HEART RATE: 57 BPM

## 2023-11-07 DIAGNOSIS — Z12.5 SPECIAL SCREENING FOR MALIGNANT NEOPLASM OF PROSTATE: Primary | ICD-10-CM

## 2023-11-07 DIAGNOSIS — Z00.00 ROUTINE GENERAL MEDICAL EXAMINATION AT A HEALTH CARE FACILITY: Primary | ICD-10-CM

## 2023-11-07 DIAGNOSIS — Z00.00 ROUTINE GENERAL MEDICAL EXAMINATION AT A HEALTH CARE FACILITY: ICD-10-CM

## 2023-11-07 LAB
DEPRECATED RDW RBC AUTO: 41 FL (ref 37–54)
ERYTHROCYTE [DISTWIDTH] IN BLOOD BY AUTOMATED COUNT: 12.5 % (ref 12.3–15.4)
HCT VFR BLD AUTO: 45.7 % (ref 37.5–51)
HGB BLD-MCNC: 15.5 G/DL (ref 13–17.7)
MCH RBC QN AUTO: 30.2 PG (ref 26.6–33)
MCHC RBC AUTO-ENTMCNC: 33.9 G/DL (ref 31.5–35.7)
MCV RBC AUTO: 89.1 FL (ref 79–97)
PLATELET # BLD AUTO: 270 10*3/MM3 (ref 140–450)
PMV BLD AUTO: 10.8 FL (ref 6–12)
RBC # BLD AUTO: 5.13 10*6/MM3 (ref 4.14–5.8)
WBC NRBC COR # BLD: 6.03 10*3/MM3 (ref 3.4–10.8)

## 2023-11-07 PROCEDURE — 80061 LIPID PANEL: CPT | Performed by: PHYSICIAN ASSISTANT

## 2023-11-07 PROCEDURE — 80050 GENERAL HEALTH PANEL: CPT | Performed by: PHYSICIAN ASSISTANT

## 2023-11-07 PROCEDURE — G0103 PSA SCREENING: HCPCS | Performed by: PHYSICIAN ASSISTANT

## 2023-11-07 PROCEDURE — 36415 COLL VENOUS BLD VENIPUNCTURE: CPT | Performed by: PHYSICIAN ASSISTANT

## 2023-11-07 RX ORDER — INSULIN PMP CART,AUT,G6/7,CNTR
EACH SUBCUTANEOUS
COMMUNITY
Start: 2023-10-23

## 2023-11-07 RX ORDER — LANSOPRAZOLE 30 MG/1
30 CAPSULE, DELAYED RELEASE ORAL DAILY
COMMUNITY
Start: 2023-10-31

## 2023-11-07 NOTE — PROGRESS NOTES
Subjective   Luis Faust is a 58 y.o. male  Annual Exam (Not fasting. UTD on colorectal screening (due in 2027))      History of Present Illness  Patient is a pleasant 58-year-old white male who comes in for preventive medical examination denies any new problems or complaints up-to-date on colorectal screening  The following portions of the patient's history were reviewed and updated as appropriate: allergies, current medications, past social history and problem list    Review of Systems   Constitutional: Negative.    HENT: Negative.     Eyes: Negative.    Respiratory: Negative.     Cardiovascular: Negative.    Gastrointestinal: Negative.    Endocrine: Negative.    Genitourinary: Negative.    Musculoskeletal: Negative.    Skin: Negative.    Allergic/Immunologic: Negative.    Neurological: Negative.    Hematological: Negative.    Psychiatric/Behavioral: Negative.     All other systems reviewed and are negative.      Objective     Vitals:    11/07/23 0952   BP: 122/68   Pulse: 57   Resp: 14   Temp: 97.5 °F (36.4 °C)   SpO2: 99%       Physical Exam  Vitals and nursing note reviewed.   Constitutional:       General: He is not in acute distress.     Appearance: Normal appearance. He is well-developed. He is not ill-appearing, toxic-appearing or diaphoretic.   HENT:      Head: Normocephalic and atraumatic.      Right Ear: External ear normal.      Left Ear: External ear normal.   Eyes:      Conjunctiva/sclera: Conjunctivae normal.      Pupils: Pupils are equal, round, and reactive to light.   Neck:      Thyroid: No thyromegaly.      Vascular: No carotid bruit.   Cardiovascular:      Rate and Rhythm: Normal rate and regular rhythm.      Pulses: Normal pulses.      Heart sounds: Normal heart sounds. No murmur heard.  Pulmonary:      Effort: Pulmonary effort is normal. No respiratory distress.      Breath sounds: Normal breath sounds.   Abdominal:      General: Bowel sounds are normal.      Palpations: Abdomen is  soft. There is no mass.      Tenderness: There is no abdominal tenderness.   Musculoskeletal:         General: No swelling. Normal range of motion.      Cervical back: Normal range of motion and neck supple.   Lymphadenopathy:      Cervical: No cervical adenopathy.   Skin:     General: Skin is warm and dry.      Findings: No lesion or rash.   Neurological:      Mental Status: He is alert and oriented to person, place, and time.      Cranial Nerves: No cranial nerve deficit.      Sensory: No sensory deficit.      Motor: No weakness.      Coordination: Coordination normal.      Gait: Gait normal.      Deep Tendon Reflexes: Reflexes are normal and symmetric.   Psychiatric:         Mood and Affect: Mood normal.         Behavior: Behavior normal.         Thought Content: Thought content normal.         Judgment: Judgment normal.         Assessment & Plan     Diagnoses and all orders for this visit:    1. Special screening for malignant neoplasm of prostate (Primary)  -     PSA Screen; Future    2. Routine general medical examination at a health care facility  -     Comprehensive Metabolic Panel; Future  -     Lipid Panel; Future  -     TSH; Future  -     CBC (No Diff); Future     Preventive medicine discussed, diet, exercise, healthy living discussed at length.  Discussed nutrition, physical activity, healthy weight, injury prevention, misuse of tobacco, alcohol and drugs, dental health, mental health, immunizations, screening    Part of this note may be an electronic transcription/translation of spoken language to printed text using the Dragon Dictation System.

## 2023-11-08 LAB
ALBUMIN SERPL-MCNC: 4.5 G/DL (ref 3.5–5.2)
ALBUMIN/GLOB SERPL: 1.8 G/DL
ALP SERPL-CCNC: 65 U/L (ref 39–117)
ALT SERPL W P-5'-P-CCNC: 22 U/L (ref 1–41)
ANION GAP SERPL CALCULATED.3IONS-SCNC: 10 MMOL/L (ref 5–15)
AST SERPL-CCNC: 22 U/L (ref 1–40)
BILIRUB SERPL-MCNC: 0.4 MG/DL (ref 0–1.2)
BUN SERPL-MCNC: 14 MG/DL (ref 8–23)
BUN/CREAT SERPL: 13.6 (ref 7–25)
CALCIUM SPEC-SCNC: 9.8 MG/DL (ref 8.6–10.5)
CHLORIDE SERPL-SCNC: 106 MMOL/L (ref 98–107)
CHOLEST SERPL-MCNC: 119 MG/DL (ref 0–200)
CO2 SERPL-SCNC: 26 MMOL/L (ref 22–29)
CREAT SERPL-MCNC: 1.03 MG/DL (ref 0.76–1.27)
EGFRCR SERPLBLD CKD-EPI 2021: 83.2 ML/MIN/1.73
GLOBULIN UR ELPH-MCNC: 2.5 GM/DL
GLUCOSE SERPL-MCNC: 64 MG/DL (ref 65–99)
HDLC SERPL-MCNC: 45 MG/DL (ref 40–60)
LDLC SERPL CALC-MCNC: 55 MG/DL (ref 0–100)
LDLC/HDLC SERPL: 1.19 {RATIO}
POTASSIUM SERPL-SCNC: 4 MMOL/L (ref 3.5–5.2)
PROT SERPL-MCNC: 7 G/DL (ref 6–8.5)
PSA SERPL-MCNC: 0.6 NG/ML (ref 0–4)
SODIUM SERPL-SCNC: 142 MMOL/L (ref 136–145)
TRIGL SERPL-MCNC: 102 MG/DL (ref 0–150)
TSH SERPL DL<=0.05 MIU/L-ACNC: 3.26 UIU/ML (ref 0.27–4.2)
VLDLC SERPL-MCNC: 19 MG/DL (ref 5–40)

## 2024-07-24 RX ORDER — SILDENAFIL 100 MG/1
TABLET, FILM COATED ORAL
Qty: 30 TABLET | Refills: 11 | Status: SHIPPED | OUTPATIENT
Start: 2024-07-24

## 2024-07-30 ENCOUNTER — OFFICE VISIT (OUTPATIENT)
Dept: FAMILY MEDICINE CLINIC | Facility: CLINIC | Age: 59
End: 2024-07-30
Payer: COMMERCIAL

## 2024-07-30 VITALS
HEIGHT: 69 IN | HEART RATE: 61 BPM | TEMPERATURE: 98 F | RESPIRATION RATE: 16 BRPM | OXYGEN SATURATION: 98 % | WEIGHT: 207 LBS | BODY MASS INDEX: 30.66 KG/M2 | DIASTOLIC BLOOD PRESSURE: 66 MMHG | SYSTOLIC BLOOD PRESSURE: 124 MMHG

## 2024-07-30 DIAGNOSIS — Z87.2 HISTORY OF PILONIDAL CYST: ICD-10-CM

## 2024-07-30 DIAGNOSIS — M53.3 COCCYX PAIN: Primary | ICD-10-CM

## 2024-07-30 PROCEDURE — 99213 OFFICE O/P EST LOW 20 MIN: CPT | Performed by: FAMILY MEDICINE

## 2024-07-30 RX ORDER — DOXYCYCLINE HYCLATE 100 MG/1
100 CAPSULE ORAL 2 TIMES DAILY
Qty: 20 CAPSULE | Refills: 0 | Status: SHIPPED | OUTPATIENT
Start: 2024-07-30 | End: 2024-08-09

## 2024-07-30 RX ORDER — MONTELUKAST SODIUM 10 MG/1
10 TABLET ORAL
COMMUNITY

## 2024-07-30 RX ORDER — LOSARTAN POTASSIUM 50 MG/1
50 TABLET ORAL DAILY
COMMUNITY
Start: 2024-06-28

## 2024-07-30 RX ORDER — ATORVASTATIN CALCIUM 10 MG/1
10 TABLET, FILM COATED ORAL DAILY
COMMUNITY
Start: 2024-06-28

## 2024-07-30 NOTE — PROGRESS NOTES
Subjective   Luis Faust is a 58 y.o. male    Chief Complaint    Tailbone pain  History of pilonidal cyst    History of Present Illness  History of Present Illness  The patient is a 58-year-old male who presents today complaining of tailbone pain for the last month. He reports that he has had 2 pilonidal cyst removal procedures about 30 years ago that felt somewhat similar. He has not had any drainage. He is a diabetic on an insulin pump.    He underwent two pilonidal cyst removal procedures in his late 20s and early 30s. Currently, he is experiencing soreness but no issues. He has been working with a  for the past 2 years and recently started running and weight training. He has not experienced any falls, but is unsure if these strains are related. He denies any drainage, blood, or abnormalities in his underwear. He does not engage in sit-ups, but performs leg presses. The intensity of his pain differs from the pain he experienced when he had a pilonidal cyst. He rates his pain as 2 out of 10.          The following portions of the patient's history were reviewed and updated as appropriate: allergies, current medications, past social history and problem list    Review of Systems   Constitutional:  Negative for appetite change, chills, diaphoresis, fatigue, fever and unexpected weight change.   Eyes:  Negative for visual disturbance.   Respiratory:  Negative for cough, chest tightness, shortness of breath and wheezing.    Cardiovascular:  Negative for chest pain, palpitations and leg swelling.   Gastrointestinal:  Negative for abdominal pain, diarrhea, nausea and vomiting.   Endocrine: Negative for polydipsia, polyphagia and polyuria.   Genitourinary:  Negative for dysuria, frequency and urgency.   Musculoskeletal:  Negative for arthralgias, back pain and myalgias.   Skin:  Negative for color change and rash.   Neurological:  Negative for dizziness, syncope, weakness, light-headedness, numbness and  headaches.   Hematological:  Negative for adenopathy. Does not bruise/bleed easily.       Objective     Vitals:    07/30/24 0802   BP: 124/66   Pulse: 61   Resp: 16   Temp: 98 °F (36.7 °C)   SpO2: 98%       Physical Exam  Vitals and nursing note reviewed.   Constitutional:       General: He is not in acute distress.     Appearance: Normal appearance. He is well-developed. He is not ill-appearing, toxic-appearing or diaphoretic.   HENT:      Head: Normocephalic and atraumatic.   Eyes:      General: No scleral icterus.     Conjunctiva/sclera: Conjunctivae normal.   Neck:      Thyroid: No thyromegaly.      Vascular: No carotid bruit or JVD.   Cardiovascular:      Rate and Rhythm: Normal rate and regular rhythm.      Pulses: Normal pulses.      Heart sounds: Normal heart sounds. No murmur heard.  Pulmonary:      Effort: Pulmonary effort is normal. No respiratory distress.      Breath sounds: Normal breath sounds.   Abdominal:      Palpations: Abdomen is soft. There is no mass.      Tenderness: There is no abdominal tenderness.   Musculoskeletal:      Cervical back: Neck supple.   Lymphadenopathy:      Cervical: No cervical adenopathy.   Skin:     General: Skin is warm and dry.      Comments: In the pilonidal area, superior crease of the buttocks a visible scar is present.  This likely represents his previous excision of pilonidal cyst.  No erythema, drainage, induration or other signs of infection are present.  Fibrous tissue that is minimally minimally tender beneath the scar but nothing that feels cystic.   Neurological:      Mental Status: He is alert.      Sensory: No sensory deficit.   Psychiatric:         Mood and Affect: Mood normal.         Behavior: Behavior normal.       Physical Exam      Assessment & Plan   Assessment & Plan  1. Tailbone pain.  Scar tissue is present, but no fluctuance, pus, or cysts are felt. The soreness is likely musculoskeletal, possibly due to frequent workouts. The symptoms do not  align with a pilonidal cyst. A 10-day course of antibiotics was prescribed. A referral to a surgeon was recommended for further evaluation if symptoms improve with the antibiotics. Should there be no improvement, the discomfort may be due to an irritated musculoskeletal system.    Problems Addressed this Visit    None  Visit Diagnoses       Coccyx pain    -  Primary    Relevant Medications    doxycycline (VIBRAMYCIN) 100 MG capsule    History of pilonidal cyst        Relevant Medications    doxycycline (VIBRAMYCIN) 100 MG capsule          Diagnoses         Codes Comments    Coccyx pain    -  Primary ICD-10-CM: M53.3  ICD-9-CM: 724.79     History of pilonidal cyst     ICD-10-CM: Z87.2  ICD-9-CM: V13.3           I spent 25 minutes in patient care: Reviewing records prior to the visit, examining the patient, entering orders and documentation    Part of this note may be an electronic transcription/translation of spoken language to printed text using the Dragon Dictation System.

## 2024-09-09 ENCOUNTER — HOSPITAL ENCOUNTER (OUTPATIENT)
Dept: GENERAL RADIOLOGY | Facility: HOSPITAL | Age: 59
Discharge: HOME | End: 2024-09-09
Admitting: FAMILY MEDICINE
Payer: COMMERCIAL

## 2024-09-09 ENCOUNTER — OFFICE VISIT (OUTPATIENT)
Dept: FAMILY MEDICINE CLINIC | Facility: CLINIC | Age: 59
End: 2024-09-09
Payer: COMMERCIAL

## 2024-09-09 VITALS
BODY MASS INDEX: 30.07 KG/M2 | WEIGHT: 203 LBS | HEART RATE: 59 BPM | HEIGHT: 69 IN | TEMPERATURE: 97.6 F | OXYGEN SATURATION: 99 % | DIASTOLIC BLOOD PRESSURE: 78 MMHG | SYSTOLIC BLOOD PRESSURE: 120 MMHG | RESPIRATION RATE: 16 BRPM

## 2024-09-09 DIAGNOSIS — M53.3 COCCYX PAIN: Primary | ICD-10-CM

## 2024-09-09 DIAGNOSIS — M53.3 COCCYX PAIN: ICD-10-CM

## 2024-09-09 PROCEDURE — 99213 OFFICE O/P EST LOW 20 MIN: CPT | Performed by: FAMILY MEDICINE

## 2024-09-09 PROCEDURE — 72220 X-RAY EXAM SACRUM TAILBONE: CPT

## 2024-09-09 RX ORDER — INSULIN PMP CART,AUT,G6/7,CNTR
EACH SUBCUTANEOUS
COMMUNITY
Start: 2024-08-13

## 2024-09-09 RX ORDER — INSULIN GLARGINE-YFGN 100 [IU]/ML
INJECTION, SOLUTION SUBCUTANEOUS
COMMUNITY
Start: 2024-07-10

## 2024-09-09 RX ORDER — PROCHLORPERAZINE 25 MG/1
SUPPOSITORY RECTAL
COMMUNITY
Start: 2024-09-01

## 2024-12-23 DIAGNOSIS — M53.3 COCCYX PAIN: Primary | ICD-10-CM

## 2025-02-26 ENCOUNTER — OFFICE VISIT (OUTPATIENT)
Dept: FAMILY MEDICINE CLINIC | Facility: CLINIC | Age: 60
End: 2025-02-26
Payer: COMMERCIAL

## 2025-02-26 VITALS
SYSTOLIC BLOOD PRESSURE: 130 MMHG | DIASTOLIC BLOOD PRESSURE: 60 MMHG | HEART RATE: 52 BPM | BODY MASS INDEX: 30.13 KG/M2 | OXYGEN SATURATION: 97 % | RESPIRATION RATE: 14 BRPM | WEIGHT: 203.4 LBS | HEIGHT: 69 IN

## 2025-02-26 DIAGNOSIS — R51.9 PERSISTENT HEADACHES: Primary | ICD-10-CM

## 2025-02-26 PROCEDURE — 99213 OFFICE O/P EST LOW 20 MIN: CPT | Performed by: PHYSICIAN ASSISTANT

## 2025-02-26 NOTE — PROGRESS NOTES
Subjective   Luis Faust is a 59 y.o. male  Headache (Intermittent x1 one month above eyes, primarily Rt eye. Pain lasts approx 15 seconds or less each time. No vision changes. )      Headache    History of Present Illness  The patient presents for evaluation of headache and pelvic pain.    He is a type 1 diabetic and has been experiencing headaches for the past month, which he initially described as a sharp, stabbing pain over his right eye that lasted between 20 to 30 seconds. This episode did not impact his vision but caused discomfort around the eye. Since then, the headaches have been occurring bilaterally, with a decrease in both duration and intensity. He experiences these headaches daily, with 2 to 3 episodes per day, each lasting approximately 15 seconds. The intensity of the pain, initially rated as 7 or 8 out of 10, has now reduced to less than 5 out of 10. He reports no associated symptoms such as blurry vision, photophobia, nausea, dizziness, or lightheadedness. He has found some relief with Advil. His last ophthalmological examination was conducted in 11/2024, during which no abnormalities were detected. He also reports no presence of any rashes or painful blisters on his head.    H  MEDICATIONS  Current: Advil  Discontinued: aspirin    The following portions of the patient's history were reviewed and updated as appropriate: allergies, current medications, past social history and problem list    Review of Systems   Constitutional:  Negative for chills, diaphoresis, fatigue and fever.   HENT:  Negative for congestion and sore throat.    Respiratory:  Negative for cough.    Cardiovascular:  Negative for chest pain.   Gastrointestinal:  Negative for abdominal pain, nausea and vomiting.   Genitourinary:  Negative for dysuria.   Musculoskeletal:  Negative for myalgias and neck pain.   Skin:  Negative for rash.   Neurological:  Positive for headaches. Negative for weakness and numbness.        Objective     Vitals:    02/26/25 1507   BP: 130/60   Pulse: 52   Resp: 14   SpO2: 97%       Physical Exam  Vitals and nursing note reviewed.   Constitutional:       General: He is not in acute distress.     Appearance: Normal appearance. He is well-developed. He is not ill-appearing, toxic-appearing or diaphoretic.   Neck:      Vascular: No carotid bruit or JVD.   Cardiovascular:      Rate and Rhythm: Normal rate and regular rhythm.      Pulses: Normal pulses.      Heart sounds: Normal heart sounds. No murmur heard.  Pulmonary:      Effort: Pulmonary effort is normal. No respiratory distress.      Breath sounds: Normal breath sounds.   Abdominal:      Palpations: Abdomen is soft.      Tenderness: There is no abdominal tenderness.   Skin:     General: Skin is warm and dry.   Neurological:      Mental Status: He is alert.       Physical Exam  Pupils are reactive to light. Good strength, no facial droop and no sign of a stroke.    Assessment & Plan   Assessment & Plan  1. Headache.  The etiology of the headache could potentially be tension-related, cluster headache, or shingles. However, given his history of type 1 diabetes and age over 50, it is prudent to rule out other causes. Neurological examination reveals no abnormalities, with pupils reactive to light, good strength, no facial droop, and no signs of a stroke. The possibility of a transient ischemic attack (TIA) is unlikely. An MRI of the head will be ordered to exclude any underlying conditions. He is advised to continue taking Advil as needed for pain management. If the MRI results are normal but the headaches persist, a referral to a neurologist will be considered. He is also advised to seek immediate medical attention at the emergency room if the headaches worsen in severity or are accompanied by new symptoms such as dizziness, nausea, or vomiting.        Diagnoses and all orders for this visit:    1. Persistent headaches (Primary)  -     MRI Brain  Without Contrast; Future     I spent 15 minutes in patient care: Reviewing records prior to the visit, examining the patient, entering orders and documentation    Part of this note may be an electronic transcription/translation of spoken language to printed text using the Dragon Dictation System.       Patient or patient representative verbalized consent for the use of Ambient Listening during the visit with  REGINALDO Muñoz for chart documentation. 2/26/2025  15:43 EST

## 2025-03-19 ENCOUNTER — RESULTS FOLLOW-UP (OUTPATIENT)
Dept: FAMILY MEDICINE CLINIC | Facility: CLINIC | Age: 60
End: 2025-03-19
Payer: COMMERCIAL

## 2025-03-19 NOTE — LETTER
Luis Faust  [address] [facility] [address]    March 19, 2025     Dear  Brettsusannamiguel ángel:    Below are the results from your recent visit:      MRI showed no acute findings only showed chronic changes associated with age.      If you have any questions or concerns, please don't hesitate to call.         Sincerely,        REGINALDO Muñoz

## 2025-03-26 ENCOUNTER — PATIENT MESSAGE (OUTPATIENT)
Dept: FAMILY MEDICINE CLINIC | Facility: CLINIC | Age: 60
End: 2025-03-26
Payer: COMMERCIAL

## 2025-03-28 ENCOUNTER — OFFICE VISIT (OUTPATIENT)
Dept: FAMILY MEDICINE CLINIC | Facility: CLINIC | Age: 60
End: 2025-03-28
Payer: COMMERCIAL

## 2025-03-28 VITALS
HEART RATE: 64 BPM | HEIGHT: 69 IN | DIASTOLIC BLOOD PRESSURE: 68 MMHG | TEMPERATURE: 98.5 F | WEIGHT: 201 LBS | OXYGEN SATURATION: 97 % | BODY MASS INDEX: 29.77 KG/M2 | SYSTOLIC BLOOD PRESSURE: 136 MMHG

## 2025-03-28 DIAGNOSIS — E10.9 TYPE 1 DIABETES MELLITUS WITHOUT COMPLICATION: ICD-10-CM

## 2025-03-28 DIAGNOSIS — R05.8 PRODUCTIVE COUGH: ICD-10-CM

## 2025-03-28 DIAGNOSIS — H10.023 OTHER MUCOPURULENT CONJUNCTIVITIS OF BOTH EYES: ICD-10-CM

## 2025-03-28 DIAGNOSIS — J40 BRONCHITIS: ICD-10-CM

## 2025-03-28 DIAGNOSIS — R50.9 FEVER, UNSPECIFIED FEVER CAUSE: Primary | ICD-10-CM

## 2025-03-28 LAB
EXPIRATION DATE: NORMAL
FLUAV AG UPPER RESP QL IA.RAPID: NOT DETECTED
FLUBV AG UPPER RESP QL IA.RAPID: NOT DETECTED
INTERNAL CONTROL: NORMAL
Lab: NORMAL
SARS-COV-2 AG UPPER RESP QL IA.RAPID: NOT DETECTED

## 2025-03-28 RX ORDER — ALBUTEROL SULFATE 90 UG/1
2 INHALANT RESPIRATORY (INHALATION) EVERY 4 HOURS PRN
Qty: 6.7 G | Refills: 1 | Status: SHIPPED | OUTPATIENT
Start: 2025-03-28

## 2025-03-28 RX ORDER — BROMPHENIRAMINE MALEATE, PSEUDOEPHEDRINE HYDROCHLORIDE, AND DEXTROMETHORPHAN HYDROBROMIDE 2; 30; 10 MG/5ML; MG/5ML; MG/5ML
5 SYRUP ORAL 4 TIMES DAILY PRN
Qty: 180 ML | Refills: 1 | Status: SHIPPED | OUTPATIENT
Start: 2025-03-28

## 2025-03-28 RX ORDER — AZITHROMYCIN 250 MG/1
TABLET, FILM COATED ORAL
Qty: 6 TABLET | Refills: 0 | Status: SHIPPED | OUTPATIENT
Start: 2025-03-28

## 2025-03-28 NOTE — PROGRESS NOTES
Subjective   Luis Faust is a 59 y.o. male  Cough (Productive cough and fever since trip to [facility] last week)      History of Present Illness  History of Present Illness  The patient is a 59-year-old male who presents today for evaluation of persistent fever and cough since returning from the [facility].    He reports experiencing sinus pressure, congestion, and soreness in the posterior nasal region, suggestive of an impending cold. His allergy symptoms were exacerbated during his trip to the [facility]. He also reports ocular redness, which is a new symptom for him, but does not experience any associated itching or visual disturbances. He has been experiencing morning mucus discharge from his eyes, a symptom he has not previously encountered. He reports no current eye pain or irritation. He has a history of conjunctivitis but notes that his current symptoms are dissimilar. He has been managing these symptoms with Flonase twice daily and Zyrtec once daily, as recommended by his son, a gastroenterology fellow. Despite this regimen, his symptoms have not improved.    Upon his return home on Saturday, he experienced chills on Sunday and Monday night. On Tuesday, he took two Advil before bedtime, which resulted in fever resolution around midnight, accompanied by sweating. He also reports a sore throat and a cough, which he describes as severe and reminiscent of a 50-year smoking history. He does not have asthma and does not require inhalers when he is well. He occasionally produces sputum during coughing episodes, which has been cloudy since Monday. He reports an improvement in his chills. He has been using Bromfed for his cough, a medication prescribed by Dr. Bruno in 2022.    He has diabetes. His blood glucose levels have been slightly elevated but not significantly so. He uses a Dexcom monitor and insulin pump for management.    MEDICATIONS  Flonase, Zyrtec, Advil, Bromfed    The following portions of the  patient's history were reviewed and updated as appropriate: allergies, current medications, past social history and problem list    Review of Systems   Constitutional:  Positive for chills and fever.   HENT:  Positive for postnasal drip and sore throat. Negative for ear pain and rhinorrhea.    Eyes:  Positive for discharge and redness. Negative for pain, itching and visual disturbance.   Respiratory:  Positive for cough. Negative for shortness of breath and wheezing.    Cardiovascular:  Negative for chest pain.   Musculoskeletal:  Negative for myalgias.   Skin:  Negative for rash.   Neurological:  Positive for headaches.       Objective     Vitals:    03/28/25 1554   BP: 136/68   Pulse: 64   Temp: 98.5 °F (36.9 °C)   SpO2: 97%       Physical Exam  Vitals and nursing note reviewed.   Constitutional:       General: He is not in acute distress.     Appearance: Normal appearance. He is well-developed. He is ill-appearing. He is not toxic-appearing or diaphoretic.   HENT:      Head: Normocephalic and atraumatic.      Right Ear: Tympanic membrane, ear canal and external ear normal.      Left Ear: Tympanic membrane, ear canal and external ear normal.      Nose: Nose normal.      Mouth/Throat:      Mouth: Mucous membranes are moist.      Pharynx: Oropharynx is clear. No oropharyngeal exudate or posterior oropharyngeal erythema.   Eyes:      General: Lids are normal.         Right eye: No discharge.         Left eye: No discharge.      Conjunctiva/sclera:      Right eye: Right conjunctiva is injected.      Left eye: Left conjunctiva is injected.   Neck:      Vascular: No JVD.   Cardiovascular:      Rate and Rhythm: Normal rate and regular rhythm.      Heart sounds: Normal heart sounds. No murmur heard.  Pulmonary:      Effort: Pulmonary effort is normal. No respiratory distress.      Breath sounds: No stridor. Wheezing present.   Musculoskeletal:      Cervical back: Neck supple.   Lymphadenopathy:      Cervical: No cervical  adenopathy.   Neurological:      Mental Status: He is alert.       Physical Exam  The throat is slightly irritated but not really red. The eyes are clearly infected.  There is a lot of irritation in the upper airway of the lungs.    Assessment & Plan   Assessment & Plan  1. Bacterial infection.  Symptoms suggest a bacterial infection involving the eyes and sinuses, potentially contagious as indicated by the onset of similar symptoms in his wife. A broad-spectrum antibiotic, Z-Rashad (azithromycin), will be initiated to address any respiratory, ophthalmic, or sinus bacteria. He is advised to start the medication tonight.    2. Bronchitis.  Lung examination reveals the development of bronchitis in the upper airway. A prescription for Bromfed cough syrup will be provided to manage the cough. He is advised to use fresh cough syrup and inhalers.    3. Diabetes Mellitus.  Blood sugar levels have been slightly elevated but manageable with his current regimen. He uses a Dexcom monitor and insulin pump to manage his blood sugar levels.    4. Allergic rhinitis.  He is currently taking Flonase twice a day and Zyrtec once a day for allergy management. He is advised to continue using Flonase unless it causes excessive nasal dryness, in which case the dosage can be reduced to once daily.    Diagnoses and all orders for this visit:    1. Fever, unspecified fever cause (Primary)  -     POCT SARS-CoV-2 Antigen XOCHILT + Flu    2. Productive cough  -     POCT SARS-CoV-2 Antigen XOCHILT + Flu    3. Bronchitis    4. Other mucopurulent conjunctivitis of both eyes    5. Type 1 diabetes mellitus without complication    Other orders  -     brompheniramine-pseudoephedrine-DM 30-2-10 MG/5ML syrup; Take 5 mL by mouth 4 (Four) Times a Day As Needed for Cough.  Dispense: 180 mL; Refill: 1  -     azithromycin (Zithromax Z-Rashad) 250 MG tablet; Take 2 tablets by mouth on day 1, then 1 tablet daily on days 2-5  Dispense: 6 tablet; Refill: 0  -     albuterol  sulfate  (90 Base) MCG/ACT inhaler; Inhale 2 puffs Every 4 (Four) Hours As Needed for Wheezing or Shortness of Air (coughing).  Dispense: 6.7 g; Refill: 1         Patient or patient representative verbalized consent for the use of Ambient Listening during the visit with  Tamra Ramirez PA-C for chart documentation. 3/28/2025  17:13 EDT

## 2025-03-31 ENCOUNTER — TELEPHONE (OUTPATIENT)
Dept: FAMILY MEDICINE CLINIC | Facility: CLINIC | Age: 60
End: 2025-03-31

## 2025-03-31 NOTE — TELEPHONE ENCOUNTER
3/30/25  8:55 PM  Please tell REGINALDO Ramirez I appreciate her seeing me Friday. I began prescriptions Friday night, and by Sunday night my eyes are no longer super-red, my cough is almost gone and sore throat is done.   Thanks a lot for getting me in to see her!  Nikhil Faust

## 2025-08-13 RX ORDER — SCOPOLAMINE 1 MG/3D
1 PATCH, EXTENDED RELEASE TRANSDERMAL
Qty: 4 PATCH | Refills: 0 | Status: SHIPPED | OUTPATIENT
Start: 2025-08-13